# Patient Record
Sex: MALE | Race: WHITE | NOT HISPANIC OR LATINO
[De-identification: names, ages, dates, MRNs, and addresses within clinical notes are randomized per-mention and may not be internally consistent; named-entity substitution may affect disease eponyms.]

---

## 2018-08-30 PROBLEM — Z00.00 ENCOUNTER FOR PREVENTIVE HEALTH EXAMINATION: Status: ACTIVE | Noted: 2018-08-30

## 2018-09-28 ENCOUNTER — APPOINTMENT (OUTPATIENT)
Dept: COLORECTAL SURGERY | Facility: CLINIC | Age: 53
End: 2018-09-28

## 2018-10-12 ENCOUNTER — APPOINTMENT (OUTPATIENT)
Dept: COLORECTAL SURGERY | Facility: CLINIC | Age: 53
End: 2018-10-12

## 2018-10-22 ENCOUNTER — APPOINTMENT (OUTPATIENT)
Dept: COLORECTAL SURGERY | Facility: CLINIC | Age: 53
End: 2018-10-22
Payer: MEDICAID

## 2018-10-22 VITALS
HEART RATE: 73 BPM | TEMPERATURE: 98.6 F | WEIGHT: 208 LBS | DIASTOLIC BLOOD PRESSURE: 85 MMHG | BODY MASS INDEX: 33.43 KG/M2 | HEIGHT: 66 IN | SYSTOLIC BLOOD PRESSURE: 144 MMHG

## 2018-10-22 PROCEDURE — 99202 OFFICE O/P NEW SF 15 MIN: CPT | Mod: 25

## 2018-10-22 PROCEDURE — 46221 LIGATION OF HEMORRHOID(S): CPT

## 2018-11-19 ENCOUNTER — APPOINTMENT (OUTPATIENT)
Dept: COLORECTAL SURGERY | Facility: CLINIC | Age: 53
End: 2018-11-19
Payer: MEDICAID

## 2018-11-19 VITALS
HEART RATE: 71 BPM | TEMPERATURE: 99 F | WEIGHT: 214.06 LBS | BODY MASS INDEX: 34.4 KG/M2 | SYSTOLIC BLOOD PRESSURE: 142 MMHG | HEIGHT: 66 IN | DIASTOLIC BLOOD PRESSURE: 92 MMHG

## 2018-11-19 PROCEDURE — 99213 OFFICE O/P EST LOW 20 MIN: CPT | Mod: 25

## 2018-11-19 PROCEDURE — 46221 LIGATION OF HEMORRHOID(S): CPT

## 2019-01-14 ENCOUNTER — APPOINTMENT (OUTPATIENT)
Dept: COLORECTAL SURGERY | Facility: CLINIC | Age: 54
End: 2019-01-14
Payer: MEDICAID

## 2019-01-14 VITALS
BODY MASS INDEX: 34.26 KG/M2 | WEIGHT: 213.19 LBS | HEIGHT: 66 IN | TEMPERATURE: 99.2 F | SYSTOLIC BLOOD PRESSURE: 122 MMHG | HEART RATE: 72 BPM | DIASTOLIC BLOOD PRESSURE: 74 MMHG

## 2019-01-14 DIAGNOSIS — Z82.49 FAMILY HISTORY OF ISCHEMIC HEART DISEASE AND OTHER DISEASES OF THE CIRCULATORY SYSTEM: ICD-10-CM

## 2019-01-14 PROCEDURE — 99213 OFFICE O/P EST LOW 20 MIN: CPT | Mod: 25

## 2019-01-14 PROCEDURE — 46221 LIGATION OF HEMORRHOID(S): CPT

## 2019-01-14 NOTE — ASSESSMENT
[FreeTextEntry1] : I had a detailed discussion with the patient regarding optimization of bowel movements with increasing daily fiber and water intake. Both synthetic and dietary fiber recommendations were reviewed. I had strongly counseled the patient regarding the need for increasing water to help improve  bowel function.\par \par I discussed with the patient that improving  bowel function will alleviate  hemorrhoidal symptoms.\par \par Advised return in 4-6 weeks if symptoms are persistent.\par

## 2019-01-14 NOTE — PHYSICAL EXAM
[None] : no anal fissures seen [Excoriation] : no perianal excoriation [FreeTextEntry1] : The risks and benefits of rubber band ligation were discussed with the patient including but not limited to bleeding, pain, infection, and the need for future procedures. The anoscope was placed and rubber band ligation was performed of the internal hemorrhoids Left lateral and RPQ with good result. The patient tolerated the procedure well. Appropriate postprocedure instructions were given to the patient.\par

## 2019-01-14 NOTE — HISTORY OF PRESENT ILLNESS
[FreeTextEntry1] : 54 y/o M presents for f/u hemorrhoids\par RBL of left lateral hemorrhoid done 11/18 with improvement in symptoms until 2 weeks ago, 2 weeks ago patient began to notice occasional BRBPR on the TP after some BM's and itching discomfort \par Has not tried any otc medications\par BH: Daily\par Denies constipation or straining, states he has had several episodes of diarrhea\par Reports adequate dietary fiber and water intake\par No ASA/NSAIDs last 7 days

## 2019-07-26 ENCOUNTER — APPOINTMENT (OUTPATIENT)
Dept: COLORECTAL SURGERY | Facility: CLINIC | Age: 54
End: 2019-07-26

## 2019-08-19 ENCOUNTER — APPOINTMENT (OUTPATIENT)
Dept: COLORECTAL SURGERY | Facility: CLINIC | Age: 54
End: 2019-08-19
Payer: MEDICAID

## 2019-08-19 VITALS
DIASTOLIC BLOOD PRESSURE: 89 MMHG | WEIGHT: 214 LBS | BODY MASS INDEX: 34.39 KG/M2 | TEMPERATURE: 98.4 F | HEIGHT: 66 IN | HEART RATE: 65 BPM | SYSTOLIC BLOOD PRESSURE: 135 MMHG

## 2019-08-19 DIAGNOSIS — Z86.19 PERSONAL HISTORY OF OTHER INFECTIOUS AND PARASITIC DISEASES: ICD-10-CM

## 2019-08-19 PROCEDURE — 46221 LIGATION OF HEMORRHOID(S): CPT

## 2019-08-19 PROCEDURE — 99213 OFFICE O/P EST LOW 20 MIN: CPT | Mod: 25

## 2019-08-23 NOTE — HISTORY OF PRESENT ILLNESS
[FreeTextEntry1] : 54 y/o M presents for f/u hemorrhoids\par Last seen 1/19, RBL of left lateral and RPQ. Previously had a RBL of left lateral hemorrhoid 11/18\par Reports resolution of symptoms until several weeks ago when he began to notice BRBPR on the TP and in the bowl occasionally\par Reports intermittent itching, and sharp pain with protruding hemorrhoids. Needs to manually reduce tissue with almost every BM\par BH:Daily\par Admits to constipation and straining currently. Also c/o increased gas lately\par Working on dietary fiber intake, drinking more than 6 cups of water daily\par No use of stool softeners, fiber supplements or laxatives\par Last colonoscopy/EGD completed a year ago. PCP recently read through report which showed H. Pylori, began patient on treatment with antibiotics\par No ASA last 7 days, took Aleve 2 days ago

## 2019-08-23 NOTE — PHYSICAL EXAM
[Normal] : was normal [None] : there was no rectal mass  [Excoriation] : no perianal excoriation [de-identified] : left posterior anal canal papilla  [FreeTextEntry1] : A lighted anoscope was passed into the anal canal and the entire anal mucosal surface was inspected..  THe findings revealed moderate internal hemorrhoids. No masses or lesions were identified.\par \par The risks and benefits of rubber band ligation were discussed with the patient including but not limited to bleeding, pain, infection, and the need for future procedures. The anoscope was placed and rubber band ligation was performed of the internal hemorrhoids - RPQ and Left lateral with good result. The patient tolerated the procedure well. Appropriate postprocedure instructions were given to the patient.\par

## 2019-08-23 NOTE — CONSULT LETTER
[Dear  ___] : Dear  [unfilled], [Consult Letter:] : I had the pleasure of evaluating your patient, [unfilled]. [( Thank you for referring [unfilled] for consultation for _____ )] : Thank you for referring [unfilled] for consultation for [unfilled] [Please see my note below.] : Please see my note below. [Consult Closing:] : Thank you very much for allowing me to participate in the care of this patient.  If you have any questions, please do not hesitate to contact me. [Sincerely,] : Sincerely, [FreeTextEntry3] : LEN ESTRADA MD [FreeTextEntry2] : [facility]\par [id number]\[place] [address]

## 2020-06-17 ENCOUNTER — APPOINTMENT (OUTPATIENT)
Dept: COLORECTAL SURGERY | Facility: CLINIC | Age: 55
End: 2020-06-17

## 2020-07-10 ENCOUNTER — APPOINTMENT (OUTPATIENT)
Dept: COLORECTAL SURGERY | Facility: CLINIC | Age: 55
End: 2020-07-10
Payer: MEDICAID

## 2020-07-10 VITALS
BODY MASS INDEX: 33.75 KG/M2 | HEART RATE: 69 BPM | SYSTOLIC BLOOD PRESSURE: 150 MMHG | TEMPERATURE: 98.1 F | HEIGHT: 66 IN | DIASTOLIC BLOOD PRESSURE: 94 MMHG | WEIGHT: 210 LBS

## 2020-07-10 PROCEDURE — 46221 LIGATION OF HEMORRHOID(S): CPT

## 2020-07-10 PROCEDURE — 99214 OFFICE O/P EST MOD 30 MIN: CPT | Mod: 25

## 2020-07-10 NOTE — PHYSICAL EXAM
[Normal] : was normal [None] : there was no rectal mass  [Excoriation] : no perianal excoriation [de-identified] : left  lateral /- 2cm  mass - mobile at 4 cm from AV [FreeTextEntry1] : A lighted anoscope was passed into the anal canal and the entire anal mucosal surface was inspected..  The findings revealed moderate/large  internal hemorrhoids. left lateral distal rectal polyp\par \par The risks and benefits of rubber band ligation were discussed with the patient including but not limited to bleeding, pain, infection, and the need for future procedures. The anoscope was placed and rubber band ligation was performed of the internal hemorrhoids - RAQ / RPQ with good result. The patient tolerated the procedure well. Appropriate postprocedure instructions were given to the patient.\par \par \par

## 2020-07-10 NOTE — ASSESSMENT
[FreeTextEntry1] : I reviewed with the patient the findings on examination today are consistent with a distal rectal polyp and internal hemorrhoids . I discussed with him treatment options. He will plan for transanal excision of rectal polyp. Risks, benefits and alternatives of the treatment plan have been outlined and reviewed.

## 2020-07-10 NOTE — HISTORY OF PRESENT ILLNESS
[FreeTextEntry1] : 55 yo M presents for f/u hemorrhoids\par \par Last seen 8/19/19, s/p RBL to RPQ and LL\par Previously underwent RBL to LL and RPQ on 1/2019 and to LL 11/2018\par \par c/o intermittent bleeding over the last few months. Reports blood noted w/ some BMs and drips into toilet bowl. \par (+) prolapsing tissue that requires manual reduction\par (+) itching\par Uses topical Prep H or toya's w/ improvement in bleeding and itching\par BH: daily w/ straining due to hard stools\par Reports adequate dietary fiber intake. inconsistent water intake and stool gets hard\par \par Last colonoscopy/EGD completed 2018\par Denies ASA or NSAID use

## 2020-08-13 ENCOUNTER — APPOINTMENT (OUTPATIENT)
Dept: COLORECTAL SURGERY | Facility: HOSPITAL | Age: 55
End: 2020-08-13

## 2020-11-02 ENCOUNTER — APPOINTMENT (OUTPATIENT)
Dept: COLORECTAL SURGERY | Facility: CLINIC | Age: 55
End: 2020-11-02
Payer: MEDICAID

## 2020-11-02 VITALS
BODY MASS INDEX: 34.55 KG/M2 | SYSTOLIC BLOOD PRESSURE: 122 MMHG | WEIGHT: 215 LBS | HEIGHT: 66 IN | HEART RATE: 83 BPM | DIASTOLIC BLOOD PRESSURE: 76 MMHG | TEMPERATURE: 96.9 F

## 2020-11-02 DIAGNOSIS — K64.8 OTHER HEMORRHOIDS: ICD-10-CM

## 2020-11-02 PROCEDURE — 99072 ADDL SUPL MATRL&STAF TM PHE: CPT

## 2020-11-02 PROCEDURE — 46221 LIGATION OF HEMORRHOID(S): CPT

## 2020-11-02 PROCEDURE — 99214 OFFICE O/P EST MOD 30 MIN: CPT | Mod: 25

## 2020-11-02 NOTE — ASSESSMENT
[FreeTextEntry1] : I had a detailed discussion with the patient regarding optimization of bowel movements with increasing daily fiber and water intake. Both synthetic and dietary fiber recommendations were reviewed. I had strongly counseled the patient regarding the need for increasing water to help improve  bowel function.\par \par I discussed with the patient that improving  bowel function will alleviate  hemorrhoidal symptoms.\par \par Will plan for transanal polyp excision.\par The risks and befits of the treatment plan were reviewed and outlined with the patient. The patient understands the associated risks of the involved treatment and wishes to proceed. All questions were answered and explained.\par \par

## 2020-11-02 NOTE — HISTORY OF PRESENT ILLNESS
[FreeTextEntry1] : 54 yo M presents for f/u hemorrhoids\par s/p RBL to RPQ and LL 8/2019 & 1/2019, and RBL to LL 11/2018\par \par Patient last evaluated 7/10/20 w/ moderate to large internal hemorrhoids and left lateral distal rectal polyp on exam. He was scheduled for TAE, however did not complete pre-operative clearance or respond to requests for call back. \par Patient reports his father passed away at that time and was not ready for the procedure\par c/o prolapsing hemorrhoids w/ every BMs and passing flatus which requires manual reduction. (+) feels "blockage" with BMs, which improves after sitting on the toilet for 5-10 minutes and relaxing and has been applying Iban's rub with improvement\par + scant BRBPR noted on TP daily\par BH: daily w/ straining and harder stools\par Reports adequate dietary fiber, admits could improve water intake\par Last colonoscopy/EGD completed 2018\par Denies ASA, may have taken Advil in the last few days

## 2020-11-02 NOTE — PHYSICAL EXAM
[Excoriation] : no perianal excoriation [Normal] : was normal [None] : there was no rectal mass  [de-identified] : left  lateral /- 2cm  mass - mobile at 4 cm from AV [FreeTextEntry1] : A lighted anoscope was passed into the anal canal and the entire anal mucosal surface was inspected..  The findings revealed moderate internal hemorrhoids. No masses or lesions were identified.\par \par The risks and benefits of rubber band ligation were discussed with the patient including but not limited to bleeding, pain, infection, and the need for future procedures. The anoscope was placed and rubber band ligation was performed of the internal hemorrhoids- RAQ and RPQ  with good result. The patient tolerated the procedure well. Appropriate postprocedure instructions were given to the patient.\par

## 2021-01-18 ENCOUNTER — LABORATORY RESULT (OUTPATIENT)
Age: 56
End: 2021-01-18

## 2021-01-19 DIAGNOSIS — Z01.818 ENCOUNTER FOR OTHER PREPROCEDURAL EXAMINATION: ICD-10-CM

## 2021-01-21 ENCOUNTER — APPOINTMENT (OUTPATIENT)
Dept: COLORECTAL SURGERY | Facility: HOSPITAL | Age: 56
End: 2021-01-21

## 2021-02-11 ENCOUNTER — APPOINTMENT (OUTPATIENT)
Dept: COLORECTAL SURGERY | Facility: CLINIC | Age: 56
End: 2021-02-11

## 2021-02-11 NOTE — HISTORY OF PRESENT ILLNESS
[FreeTextEntry1] : 56 yo M presents for f/u rectal polyp and internal hemorrhoids\par s/p RBL to RPQ and LL 8/2019 & 1/2019, and RBL to LL 11/2018\par Previously scheduled for TAE 8/2020, however patient didn't complete pre-op clearance.\par \par Last evaluated 11/2/2020 w/ noted LL 2 cm mass, mobile at 4 cm from AV on exam.  Also noted moderate internal hemorrhoids, s/p RBL to RAQ and RPQ. TAE procedure rescheduled for 2/18/2021.\par \par Patient called today c/o 2-3 days of BRBPR noted on TP and in toilet bowl, likely in setting of constipation. Reports continued pain during and after BM which affects his ability to sleep. Admits to sitting on the toilet bowl 7+ times due to feeling wetness and passes blood. Also admits to feeling dizzy and lightheaded. Patient unable to quantify amount of blood and was agitated and demanding to be seen.\par Due to heavy rectal bleeding and associated lightheadedness, recommend presenting to ED. Patient refused.

## 2021-02-15 ENCOUNTER — LABORATORY RESULT (OUTPATIENT)
Age: 56
End: 2021-02-15

## 2021-02-16 ENCOUNTER — NON-APPOINTMENT (OUTPATIENT)
Age: 56
End: 2021-02-16

## 2021-02-17 ENCOUNTER — TRANSCRIPTION ENCOUNTER (OUTPATIENT)
Age: 56
End: 2021-02-17

## 2021-02-18 ENCOUNTER — APPOINTMENT (OUTPATIENT)
Dept: COLORECTAL SURGERY | Facility: HOSPITAL | Age: 56
End: 2021-02-18

## 2021-02-18 ENCOUNTER — RESULT REVIEW (OUTPATIENT)
Age: 56
End: 2021-02-18

## 2021-02-18 ENCOUNTER — OUTPATIENT (OUTPATIENT)
Dept: OUTPATIENT SERVICES | Facility: HOSPITAL | Age: 56
LOS: 1 days | Discharge: HOME | End: 2021-02-18
Payer: MEDICAID

## 2021-02-18 PROCEDURE — 88305 TISSUE EXAM BY PATHOLOGIST: CPT | Mod: 26

## 2021-02-18 PROCEDURE — 45171 EXC RECT TUM TRANSANAL PART: CPT | Mod: GC

## 2021-02-18 RX ORDER — OXYCODONE HYDROCHLORIDE 5 MG/1
1 TABLET ORAL
Qty: 10 | Refills: 0
Start: 2021-02-18

## 2021-02-18 RX ORDER — DOCUSATE SODIUM 100 MG
2 CAPSULE ORAL
Qty: 20 | Refills: 0
Start: 2021-02-18

## 2021-02-22 DIAGNOSIS — R39.198 OTHER DIFFICULTIES WITH MICTURITION: ICD-10-CM

## 2021-02-22 LAB — SURGICAL PATHOLOGY STUDY: SIGNIFICANT CHANGE UP

## 2021-02-22 RX ORDER — TAMSULOSIN HYDROCHLORIDE 0.4 MG/1
0.4 CAPSULE ORAL
Qty: 14 | Refills: 0 | Status: ACTIVE | COMMUNITY
Start: 2021-02-22 | End: 1900-01-01

## 2021-03-19 ENCOUNTER — APPOINTMENT (OUTPATIENT)
Dept: COLORECTAL SURGERY | Facility: CLINIC | Age: 56
End: 2021-03-19

## 2021-04-05 ENCOUNTER — APPOINTMENT (OUTPATIENT)
Dept: COLORECTAL SURGERY | Facility: CLINIC | Age: 56
End: 2021-04-05

## 2021-06-07 ENCOUNTER — APPOINTMENT (OUTPATIENT)
Dept: COLORECTAL SURGERY | Facility: CLINIC | Age: 56
End: 2021-06-07
Payer: MEDICAID

## 2021-06-07 VITALS
SYSTOLIC BLOOD PRESSURE: 135 MMHG | WEIGHT: 215 LBS | BODY MASS INDEX: 34.55 KG/M2 | TEMPERATURE: 97.6 F | DIASTOLIC BLOOD PRESSURE: 81 MMHG | HEIGHT: 66 IN | HEART RATE: 73 BPM

## 2021-06-07 DIAGNOSIS — K62.1 RECTAL POLYP: ICD-10-CM

## 2021-06-07 PROCEDURE — 99214 OFFICE O/P EST MOD 30 MIN: CPT | Mod: 25

## 2021-06-07 PROCEDURE — 46600 DIAGNOSTIC ANOSCOPY SPX: CPT

## 2021-06-07 PROCEDURE — 99072 ADDL SUPL MATRL&STAF TM PHE: CPT

## 2021-06-07 NOTE — ASSESSMENT
[FreeTextEntry1] : Recovered well from surgery. No evidence of residual polyp. Recommend routine surveillance colonoscopy with GI.

## 2021-06-07 NOTE — PHYSICAL EXAM
[Excoriation] : no perianal excoriation [Normal] : was normal [None] : there was no rectal mass  [FreeTextEntry1] : Medical assistant was present for the entire exam.\par \par Anoscopy was performed for evaluation of the patients rectal bleeding  history .\par The risks, benefits and alternatives were reviewed.\par \par A lighted anoscope was passed into the anal canal and the entire anal mucosal surface was inspected..  \par The findings revealed moderate internal hemorrhoids.\par  No masses or lesions were identified.\par \par

## 2021-06-07 NOTE — HISTORY OF PRESENT ILLNESS
[FreeTextEntry1] : 54 y/o M presents for f/u rectal polyp and hemorrhoids\par \par Last seen in the office on 11/2/20. 2 cm mobile left lateral mass noted at 4 cm from the AV were noted on exam. In addition, moderate internal hemorrhoids seen and RBL of RAQ and RPQ performed \par \par S/p TAE 2/18/21\par Final Diagnosis\par 1. 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 Right lateral anal polyp:\par - Squamo-columnar mucosa with dilated, congested blood\par vessels, consistent with hemorrhoid\par \par Denies pain, itching, bleeding, bumps or lumps\par BH:Daily\par Admits to occasional straining in the past several weeks\par Using suppositories PRN, last used 2-3 weeks ago\par Working on dietary fiber intake. Currently drinking 7-8 cups of water daily \par Last colonoscopy completed 2018 \par No ASA/NSAIDs last 7 days

## 2022-04-11 ENCOUNTER — APPOINTMENT (OUTPATIENT)
Dept: COLORECTAL SURGERY | Facility: CLINIC | Age: 57
End: 2022-04-11

## 2022-08-05 ENCOUNTER — APPOINTMENT (OUTPATIENT)
Dept: COLORECTAL SURGERY | Facility: CLINIC | Age: 57
End: 2022-08-05

## 2025-07-14 ENCOUNTER — APPOINTMENT (OUTPATIENT)
Dept: COLORECTAL SURGERY | Facility: CLINIC | Age: 60
End: 2025-07-14
Payer: MEDICAID

## 2025-07-14 ENCOUNTER — NON-APPOINTMENT (OUTPATIENT)
Age: 60
End: 2025-07-14

## 2025-07-14 VITALS
SYSTOLIC BLOOD PRESSURE: 133 MMHG | HEART RATE: 64 BPM | HEIGHT: 66 IN | TEMPERATURE: 98.1 F | WEIGHT: 215 LBS | DIASTOLIC BLOOD PRESSURE: 83 MMHG | BODY MASS INDEX: 34.55 KG/M2

## 2025-07-14 PROCEDURE — 99203 OFFICE O/P NEW LOW 30 MIN: CPT | Mod: 25

## 2025-07-14 PROCEDURE — 46600 DIAGNOSTIC ANOSCOPY SPX: CPT

## 2025-07-14 RX ORDER — POLYETHYLENE GLYCOL 3350 17 G/17G
17 POWDER, FOR SOLUTION ORAL DAILY
Qty: 540 | Refills: 3 | Status: ACTIVE | COMMUNITY
Start: 2025-07-14 | End: 1900-01-01

## 2025-07-14 NOTE — HISTORY OF PRESENT ILLNESS
[FreeTextEntry1] : 58 y/o M presents to reestablish care  Last seen in 2021 for post op s/p TAE of rectal polyp.   PMH: rectal polyp, hemorrhoids.   PSH: TAE of rectal polyp in 2021  Meds: denies Allergies: NKDA  Denies FMH of CRC or IBD Most recent colonoscopy performed 7/1/25 by Gi Dr. Christian Jackson, normal per pt.  Patient also completed EGD 7/1/25, normal per pt  Seen in 2020 for hemorrhoids. 2 cm mobile left lateral mass noted at 4 cm from the AV were noted on exam. In addition, moderate internal hemorrhoids seen. s/p RBL x2 (RAQ and RPQ)   2/18/2021 s/p TAE Final Diagnosis 1. 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 Right lateral anal polyp: - Squamo-columnar mucosa with dilated, congested blood vessels, consistent with hemorrhoid.  Last seen 6/7/2021 for post op follow up.  Recovered well from surgery. No evidence of residual polyp. Recommend routine surveillance colonoscopy with GI.  Patient presents today to re-establish care  Reports feeling well overall. Would like to get a "general checkup"  Occasionally will see a spec of blood on tissue paper following BM.  Has made lifestyle changes particularly to diet to improve BMs Has 1 BM every other day, hard stools Strains in the toilet and feels "difficulty" to pass stool at times even gas.  Denies any rectal bleeding, rectal pain.    Denies taking any ASA/NSAIDs in the last

## 2025-07-14 NOTE — ASSESSMENT
[FreeTextEntry1] : No evidence of recurrent rectal polyp.  Patient with constipation.  Outlined role of increasing fiber and hydration.  Recommend daily MiraLAX to improve bowel habits.  Avoid straining.
